# Patient Record
Sex: MALE | Race: BLACK OR AFRICAN AMERICAN
[De-identification: names, ages, dates, MRNs, and addresses within clinical notes are randomized per-mention and may not be internally consistent; named-entity substitution may affect disease eponyms.]

---

## 2018-01-28 ENCOUNTER — HOSPITAL ENCOUNTER (EMERGENCY)
Dept: HOSPITAL 72 - EMR | Age: 58
Discharge: HOME | End: 2018-01-28
Payer: MEDICAID

## 2018-01-28 VITALS — BODY MASS INDEX: 31.84 KG/M2 | WEIGHT: 215 LBS | HEIGHT: 69 IN

## 2018-01-28 VITALS — SYSTOLIC BLOOD PRESSURE: 135 MMHG | DIASTOLIC BLOOD PRESSURE: 91 MMHG

## 2018-01-28 DIAGNOSIS — B34.9: ICD-10-CM

## 2018-01-28 DIAGNOSIS — J06.9: Primary | ICD-10-CM

## 2018-01-28 PROCEDURE — 99283 EMERGENCY DEPT VISIT LOW MDM: CPT

## 2018-01-28 NOTE — EMERGENCY ROOM REPORT
History of Present Illness


General


Chief Complaint:  Flu Like Symptoms


Source:  Patient





Present Illness


HPI


57-year-old male presents to the emergency department complaining of cough, 

nasal congestion, increased phlegm x1 week.  Patient reports that 3 weeks ago 

he had similar symptoms that resolved however upon traveling to [place] his 

granddaughter was sick and he believes he may have become reinfected.  He 

denies past medical history he denies taking medications regularly he states he 

works out daily and is relatively healthy otherwise. Denies high fevers, 

lethargy, neck stiffness, irritability, photophobia, swelling of the LE's, 

dehydration, N/V/D. Denies Cp, Palpitations, LOC, AMS, seizures, paresthesias, 

or changes in Hearing or vision, no Sudden severe HA


Allergies:  


Coded Allergies:  


     No Known Allergies (Unverified , 1/20/16)





Patient History


Past Medical History:  see triage record


Past Surgical History:  none


Pertinent Family History:  none


Immunizations:  UTD


Reviewed Nursing Documentation:  PMH: Agreed, PSxH: Agreed





Review of Systems


All Other Systems:  negative except mentioned in HPI





Physical Exam





Vital Signs








  Date Time  Temp Pulse Resp B/P (MAP) Pulse Ox O2 Delivery O2 Flow Rate FiO2


 


1/28/18 12:00 98.8 67 20 135/91 100 Room Air  








Sp02 EP Interpretation:  reviewed, normal


General Appearance:  no apparent distress, alert, GCS 15, non-toxic


Head:  normocephalic, atraumatic


ENT:  hearing grossly normal, normal voice


Neck:  full range of motion


Respiratory:  chest non-tender, lungs clear, normal breath sounds, speaking 

full sentences


Cardiovascular #1:  regular rate, rhythm, no edema


Rectal:  deferred


Genitourinary:  normal inspection


Musculoskeletal:  back normal, gait/station normal, normal range of motion, non-

tender


Neurologic:  alert, oriented x3, responsive, motor strength/tone normal, 

sensory intact, speech normal, grossly normal


Psychiatric:  judgement/insight normal


Skin:  normal color, no rash, warm/dry, well hydrated


Lymphatic:  no adenopathy





Medical Decision Making


PA Attestation


Dr. Guzman is my supervising Physician whom patient management has been 

discussed with.


Diagnostic Impression:  


 Primary Impression:  


 Upper respiratory infection, viral


 Additional Impression:  


 Cough


ER Course


57-year-old male presents to the emergency department complaining of cough, 

nasal congestion, increased phlegm x1 week.  Patient reports that 3 weeks ago 

he had similar symptoms that resolved however upon traveling to [place] his 

granddaughter was sick and he believes he may have become reinfected.  He 

denies past medical history he denies taking medications regularly he states he 

works out daily and is relatively healthy otherwise. Denies high fevers, 

lethargy, neck stiffness, irritability, photophobia, swelling of the LE's, 

dehydration, N/V/D. Denies Cp, Palpitations, LOC, AMS, seizures, paresthesias, 

or changes in Hearing or vision, no Sudden severe HA





Ddx considered but are not limited to URI, pneumonia, PE, strep pharyngitis, 

meningitis.





Vital signs: Pt. is afebrile, the remaining VS are WNL


H&PE are most consistent with URI- no meningeal signs, oropharynx is not 

involved, no evidence of bacterial infection at this time. 





ORDERS: none required at this time, the diagnosis is clinical





ED INTERVENTIONS: None required at this time. 








--PT. EDUCATION: Discussed antibiotic resistance with inappropriate prescribing 

of antibiotics for viral illnesses.  Discussed signs and symptoms to indicate 

viral illness versus bacterial illness. 





DISCHARGE: At this time pt. is stable for d/c to home. Will provide printed 

patient care instructions, and any necessary prescriptions. Care plan and 

follow up instructions have been discussed with the patient prior to discharge.





Last Vital Signs








  Date Time  Temp Pulse Resp B/P (MAP) Pulse Ox O2 Delivery O2 Flow Rate FiO2


 


1/28/18 12:10 98.8  20 135/91 100 Room Air  


 


1/28/18 12:10  67      








Disposition:  HOME, SELF-CARE


Condition:  Stable


Scripts


Codeine/Promethazine Hcl* (PROMETHAZINE-CODEINE SYRUP*) 118 Ml Syrup


5 ML ORAL Q6H Y for For Cough, #118 ML 0 Refills


   Prov: Diane Slade P.A.         1/28/18 


Acetaminophen* (TYLENOL EXTRA STRENGTH*) 500 Mg Tablet


500 MG ORAL Q6H, #20 TAB 0 Refills


   Prov: Diane Slade P.A.         1/28/18 


Albuterol Sulfate* (ALBUTEROL SULFATE MDI*) 8.5 Gm Hfa.aer.ad


2 PUFF INH Q4H, #1 INH 0 Refills


   Prov: Diane Slade P.A.         1/28/18 


Benzonatate* (TESSALON PERLE*) 100 Mg Capsule


100 MG ORAL THREE TIMES A DAY for 10 Days, #30 PERLE


   Prov: Diane Slade         1/28/18 


Guaifenesin (Guaifenesin) 1,200 Mg Tab.er.12h


1200 MG PO BID for 10 Days, #20 TAB


   Prov: Diane Slade         1/28/18


Referrals:  


NOT CHOSEN IPA/MD,REFERRING (PCP)


Patient Instructions:  Upper Respiratory Infection, Adult, Easy-to-Read





Additional Instructions:  


Take medications as directed:  ****------Use cough syrup at nighttime as this 

causes drowsiness, during the day he may use Tessalon Perles for  your cough 

symptoms.





 ** Follow up with a Primary Care Provider in 3-5 days, even if your symptoms 

have resolved. ** 


--Please review list of primary care clinics, if you do not already have a 

primary care provider





Return sooner to ED if new symptoms occur, or current symptoms become worse. 





Do not drink alcohol, drive, or operate heavy machinery while taking Cough 

Syrup as this may cause drowsiness. 











- Please note that this Emergency Department Report was dictated using [employer] technology software, occasionally this can lead to 

erroneous entry secondary to interpretation by the dictation equipment.











Diane Slade Jan 28, 2018 12:34

## 2018-07-20 ENCOUNTER — HOSPITAL ENCOUNTER (EMERGENCY)
Dept: HOSPITAL 72 - EMR | Age: 58
Discharge: HOME | End: 2018-07-20
Payer: MEDICAID

## 2018-07-20 VITALS — HEIGHT: 69 IN | WEIGHT: 215 LBS | BODY MASS INDEX: 31.84 KG/M2

## 2018-07-20 VITALS — SYSTOLIC BLOOD PRESSURE: 134 MMHG | DIASTOLIC BLOOD PRESSURE: 87 MMHG

## 2018-07-20 DIAGNOSIS — G62.9: Primary | ICD-10-CM

## 2018-07-20 PROCEDURE — 99283 EMERGENCY DEPT VISIT LOW MDM: CPT

## 2018-07-20 NOTE — EMERGENCY ROOM REPORT
History of Present Illness


General


Chief Complaint:  General Complaint


Source:  Patient





Present Illness


HPI


This is a 58-year-old male with no significant past medical history.  He 

presents with bilateral hand pain and numbness.  Has been ongoing for 2 years 

but getting worse.  He said usually at night and felt numb and sleepy.  No 

deficit.  No trauma.  Denies any other complaint.  No fever or chills.  Pain is 

5 out of 10.


Allergies:  


Coded Allergies:  


     No Known Allergies (Unverified , 1/20/16)





Patient History


Past Medical History:  see triage record, old chart reviewed


Past Surgical History:  none


Pertinent Family History:  none


Social History:  Denies: smoking


Immunizations:  other


Reviewed Nursing Documentation:  PMH: Agreed; PSxH: Agreed





Nursing Documentation-PMH


Past Medical History:  No Stated History





Review of Systems


Eye:  Denies: eye pain, blurred vision


ENT:  Denies: ear pain, nose congestion, throat swelling


Respiratory:  Denies: cough, shortness of breath


Cardiovascular:  Denies: chest pain, palpitations


Gastrointestinal:  Denies: abdominal pain, diarrhea, nausea, vomiting


Musculoskeletal:  Denies: back pain, joint pain


Skin:  Denies: rash


Neurological:  Denies: headache, numbness


Endocrine:  Denies: increased thirst, increased urine


Hematologic/Lymphatic:  Denies: easy bruising


All Other Systems:  negative except mentioned in HPI





Physical Exam





Vital Signs








  Date Time  Temp Pulse Resp B/P (MAP) Pulse Ox O2 Delivery O2 Flow Rate FiO2


 


7/20/18 04:25 97.8 58 16 134/87 95 Room Air  





 97.9       





vitals normal


Sp02 EP Interpretation:  reviewed, normal


General Appearance:  well appearing, no apparent distress, alert


Head:  normocephalic, atraumatic


Eyes:  bilateral eye PERRL, bilateral eye EOMI


ENT:  hearing grossly normal, normal pharynx


Neck:  full range of motion, supple, no meningismus


Respiratory:  chest non-tender, lungs clear, normal breath sounds


Cardiovascular #1:  regular rate, rhythm, no murmur


Gastrointestinal:  normal bowel sounds, non tender, no mass, no organomegaly, 

no bruit, non-distended


Musculoskeletal:  back normal, gait/station normal, normal range of motion, 

other - Exam of bilateral hands is normal to me.  No deformity.  Sensation 

normal.  No pain over the median nerve.  No evidence of infection.


Neurologic:  alert, oriented x3


Psychiatric:  mood/affect normal


Skin:  warm/dry





Medical Decision Making


Diagnostic Impression:  


 Primary Impression:  


 Peripheral neuropathy


 Qualified Codes:  G62.9 - Polyneuropathy, unspecified


ER Course


Patient with a neuropathy to his hands.  This could be secondary to drugs, 

carpal tunnel syndrome, idiopathic to name a few.  No evidence of any 

infection.  No evidence of septic joint.  We'll discharge home.





Last Vital Signs








  Date Time  Temp Pulse Resp B/P (MAP) Pulse Ox O2 Delivery O2 Flow Rate FiO2


 


7/20/18 04:43 97.9       


 


7/20/18 04:35  58 16 134/87 95 Room Air  








Status:  unchanged


Disposition:  HOME, SELF-CARE


Condition:  Stable


Scripts


Gabapentin* (GABAPENTIN*) 300 Mg Capsule


300 MG ORAL BEDTIME, #30 CAP


   Prov: MAREK ACOSTA M.D.         7/20/18


Referrals:  


NOT CHOSEN IPA/MD,REFERRING (PCP)





Additional Instructions:  


Follow-up with your doctor in 7 days.  Return if worse.











MAREK ACOSTA M.D. Jul 20, 2018 04:57

## 2019-12-07 ENCOUNTER — HOSPITAL ENCOUNTER (EMERGENCY)
Dept: HOSPITAL 72 - EMR | Age: 59
Discharge: HOME | End: 2019-12-07
Payer: COMMERCIAL

## 2019-12-07 VITALS — BODY MASS INDEX: 31.5 KG/M2 | WEIGHT: 220 LBS | HEIGHT: 70 IN

## 2019-12-07 VITALS — SYSTOLIC BLOOD PRESSURE: 168 MMHG | DIASTOLIC BLOOD PRESSURE: 99 MMHG

## 2019-12-07 VITALS — DIASTOLIC BLOOD PRESSURE: 107 MMHG | SYSTOLIC BLOOD PRESSURE: 165 MMHG

## 2019-12-07 DIAGNOSIS — R51: Primary | ICD-10-CM

## 2019-12-07 DIAGNOSIS — I10: ICD-10-CM

## 2019-12-07 LAB
ADD MANUAL DIFF: NO
ALBUMIN SERPL-MCNC: 4.2 G/DL (ref 3.4–5)
ALBUMIN/GLOB SERPL: 1.2 {RATIO} (ref 1–2.7)
ALP SERPL-CCNC: 45 U/L (ref 46–116)
ALT SERPL-CCNC: 30 U/L (ref 12–78)
ANION GAP SERPL CALC-SCNC: 2 MMOL/L (ref 5–15)
APPEARANCE UR: CLEAR
APTT PPP: (no result) S
AST SERPL-CCNC: 30 U/L (ref 15–37)
BASOPHILS NFR BLD AUTO: 1.8 % (ref 0–2)
BILIRUB SERPL-MCNC: 0.3 MG/DL (ref 0.2–1)
BUN SERPL-MCNC: 20 MG/DL (ref 7–18)
CALCIUM SERPL-MCNC: 9 MG/DL (ref 8.5–10.1)
CHLORIDE SERPL-SCNC: 104 MMOL/L (ref 98–107)
CO2 SERPL-SCNC: 34 MMOL/L (ref 21–32)
CREAT SERPL-MCNC: 1.4 MG/DL (ref 0.55–1.3)
EOSINOPHIL NFR BLD AUTO: 2.6 % (ref 0–3)
ERYTHROCYTE [DISTWIDTH] IN BLOOD BY AUTOMATED COUNT: 10.5 % (ref 11.6–14.8)
GLOBULIN SER-MCNC: 3.5 G/DL
GLUCOSE UR STRIP-MCNC: NEGATIVE MG/DL
HCT VFR BLD CALC: 41.1 % (ref 42–52)
HGB BLD-MCNC: 14.1 G/DL (ref 14.2–18)
KETONES UR QL STRIP: NEGATIVE
LEUKOCYTE ESTERASE UR QL STRIP: NEGATIVE
LYMPHOCYTES NFR BLD AUTO: 30 % (ref 20–45)
MCV RBC AUTO: 82 FL (ref 80–99)
MONOCYTES NFR BLD AUTO: 7.1 % (ref 1–10)
NEUTROPHILS NFR BLD AUTO: 58.5 % (ref 45–75)
NITRITE UR QL STRIP: NEGATIVE
PH UR STRIP: 8 [PH] (ref 4.5–8)
PLATELET # BLD: 228 K/UL (ref 150–450)
POTASSIUM SERPL-SCNC: 4.7 MMOL/L (ref 3.5–5.1)
PROT UR QL STRIP: NEGATIVE
RBC # BLD AUTO: 5 M/UL (ref 4.7–6.1)
SODIUM SERPL-SCNC: 140 MMOL/L (ref 136–145)
SP GR UR STRIP: 1.01 (ref 1–1.03)
UROBILINOGEN UR-MCNC: NORMAL MG/DL (ref 0–1)
WBC # BLD AUTO: 7.9 K/UL (ref 4.8–10.8)

## 2019-12-07 PROCEDURE — 99284 EMERGENCY DEPT VISIT MOD MDM: CPT

## 2019-12-07 PROCEDURE — 96365 THER/PROPH/DIAG IV INF INIT: CPT

## 2019-12-07 PROCEDURE — 85025 COMPLETE CBC W/AUTO DIFF WBC: CPT

## 2019-12-07 PROCEDURE — 81003 URINALYSIS AUTO W/O SCOPE: CPT

## 2019-12-07 PROCEDURE — 70450 CT HEAD/BRAIN W/O DYE: CPT

## 2019-12-07 PROCEDURE — 96375 TX/PRO/DX INJ NEW DRUG ADDON: CPT

## 2019-12-07 PROCEDURE — 36415 COLL VENOUS BLD VENIPUNCTURE: CPT

## 2019-12-07 PROCEDURE — 80053 COMPREHEN METABOLIC PANEL: CPT

## 2019-12-07 NOTE — NUR
ER DISCHARGE NOTE:

Patient is cleared to be discharged per ERMD, pt is aox4, on room air, with 
stable vital signs. pt was given dc and prescription instructions, pt was able 
to verbalize understanding, pt id band and iv site removed without 
complications. pt is able to ambulate with steady gait. pt took all belongings. 
Pt will get picked up by familymember.

## 2019-12-07 NOTE — EMERGENCY ROOM REPORT
History of Present Illness


General


Chief Complaint:  Headache


Source:  Patient





Present Illness


HPI


Patient 59-year-old male presents after increased headache.   Patient was 

having headache for several months.  He denies taking any medications for his 

blood pressure.  He reports of increased throbbing sensation to the left side 

of his head.  He reports having taken Tylenol without improvement.  He denies 

any fever or cough.  Denies any vomiting.  He reports having no visual changes.


Allergies:  


Coded Allergies:  


     No Known Allergies (Unverified , 1/20/16)





Patient History


Past Medical History:  see triage record


Reviewed Nursing Documentation:  PMH: Agreed; PSxH: Agreed





Nursing Documentation-PMH


Past Medical History:  No Stated History





Review of Systems


All Other Systems:  negative except mentioned in HPI





Physical Exam





Vital Signs








  Date Time  Temp Pulse Resp B/P (MAP) Pulse Ox O2 Delivery O2 Flow Rate FiO2


 


12/7/19 16:52 98.6 74 18 172/110 (130) 97 Room Air  








Sp02 EP Interpretation:  reviewed, normal


General Appearance:  normal inspection, well appearing, no apparent distress, 

alert, GCS 15, non-toxic


Head:  atraumatic


ENT:  normal ENT inspection, hearing grossly normal, normal voice


Neck:  normal inspection, full range of motion, supple, no bony tend


Respiratory:  normal inspection, lungs clear, normal breath sounds, no 

respiratory distress, no retraction, no wheezing


Cardiovascular #1:  regular rate, rhythm, no edema


Gastrointestinal:  normal inspection, normal bowel sounds, non tender, soft, no 

guarding, no hernia


Genitourinary:  no CVA tenderness


Musculoskeletal:  normal inspection, back normal, normal range of motion


Neurologic:  alert, motor strength/tone normal, CNs III-XII nml as tested, EOM 

palsy, oriented, DTRs symmetric, distal neuro normal, facial droop, oriented x3

, responsive, speech normal, normal inspection


Psychiatric:  normal inspection, judgement/insight normal, mood/affect normal





Medical Decision Making


Diagnostic Impression:  


 Primary Impression:  


 Hypertension


 Additional Impression:  


 Headache


ER Course


Patient presents for increased headache.  Differential diagnosis include was 

not limited to hypertension, intracranial hemorrhage, tumor, among others.  

Patient was noted to have normal neurologic exam.  He appears to have some 

chronic headache which is been present for several months.  CT imaging was 

ordered since patient has not had prior imaging in the past.  He was given IV 

medications for pain.  He was given IV magnesium for hypertension.Patient was 

also given IV hydralazine.Appears to be stable for outpatient management.  The 

does not appear to be any evidence of meningismus.  He was advised to follow-up 

with his primary care physician for recheck.He was given prescription for 

medications for symptom medic treatment as well as for Norvasc due to high 

blood pressure.The patient is advised to follow up with  primary care doctor in 

1-2 days.  Patient is advised to return if any worsening condition or if any 

changes in status that are concerning.





This report is dictated with Dragon transcription software which may 

occasionally lead to discrepancies related to use of this software.





Labs








Test


  12/7/19


17:40


 


White Blood Count


  7.9 K/UL


(4.8-10.8)


 


Red Blood Count


  5.00 M/UL


(4.70-6.10)


 


Hemoglobin


  14.1 G/DL


(14.2-18.0)


 


Hematocrit


  41.1 %


(42.0-52.0)


 


Mean Corpuscular Volume 82 FL (80-99) 


 


Mean Corpuscular Hemoglobin


  28.2 PG


(27.0-31.0)


 


Mean Corpuscular Hemoglobin


Concent 34.3 G/DL


(32.0-36.0)


 


Red Cell Distribution Width


  10.5 %


(11.6-14.8)


 


Platelet Count


  228 K/UL


(150-450)


 


Mean Platelet Volume


  6.7 FL


(6.5-10.1)


 


Neutrophils (%) (Auto)


  58.5 %


(45.0-75.0)


 


Lymphocytes (%) (Auto)


  30.0 %


(20.0-45.0)


 


Monocytes (%) (Auto)


  7.1 %


(1.0-10.0)


 


Eosinophils (%) (Auto)


  2.6 %


(0.0-3.0)


 


Basophils (%) (Auto)


  1.8 %


(0.0-2.0)


 


Sodium Level


  140 MMOL/L


(136-145)


 


Potassium Level


  4.7 MMOL/L


(3.5-5.1)


 


Chloride Level


  104 MMOL/L


()


 


Carbon Dioxide Level


  34 MMOL/L


(21-32)


 


Anion Gap


  2 mmol/L


(5-15)


 


Blood Urea Nitrogen


  20 mg/dL


(7-18)


 


Creatinine


  1.4 MG/DL


(0.55-1.30)


 


Estimat Glomerular Filtration


Rate > 60 mL/min


(>60)


 


Glucose Level


  103 MG/DL


()


 


Calcium Level


  9.0 MG/DL


(8.5-10.1)











Last Vital Signs








  Date Time  Temp Pulse Resp B/P (MAP) Pulse Ox O2 Delivery O2 Flow Rate FiO2


 


12/7/19 16:52 98.6 74 18 172/110 (130) 97 Room Air  








Status:  improved


Disposition:  HOME, SELF-CARE


Condition:  Stable


Scripts


Acetamin/Butalbital/Caffeine* (FIORICET*) 1 Ea Tab


1 TAB ORAL Q6H, #15 TAB 0 Refills


   Prov: Jordan Maria MD         12/7/19 


Amlodipine Besylate (Norvasc) 5 Mg Tablet


5 MG ORAL DAILY, #30 TAB


   Prov: Jordan Maria MD         12/7/19











Jordan Maria MD Dec 7, 2019 17:09

## 2019-12-07 NOTE — NUR
ED Nurse Note:

Patient walked into ED from home c/o left side headache 8/10, migraine like 
throbbing pain for about 4 months. patient is alert awake x4 ambulatory steady 
gait, breathing unlabored and even, speaking in full sentences. patient on a 
hospital gown, placed on a cardiac monitor.

## 2019-12-07 NOTE — NUR
ED Nurse Note:



medication given as ordered, patient tolerated without complication, patient on 
bed resting, watching his cell phone.

## 2019-12-07 NOTE — DIAGNOSTIC IMAGING REPORT
EXAM:

  CT Head Without Intravenous Contrast

 

CLINICAL HISTORY:

  PAIN

 

TECHNIQUE:

  Axial computed tomography images of the head/brain without intravenous 

contrast.  CTDI is 60 mGy and DLP is 1244 mGy-cm.  One or more of the 

following dose reduction techniques were used: automated exposure control,

 adjustment of the mA and/or kV according to patient size, use of 

iterative reconstruction technique.

 

COMPARISON:

 

8/24/16 CT head

 

FINDINGS:

  Brain:  No hemorrhage or mass effect.

  Ventricles:  No hydrocephalus.

  Bones/joints:  Unremarkable.

  Soft tissues:  Unremarkable.

  Sinuses:  Unremarkable.

  Mastoid air cells:  Clear.

 

IMPRESSION:     

  No acute hemorrhage, hydrocephalus, or mass effect.